# Patient Record
Sex: MALE | HISPANIC OR LATINO
[De-identification: names, ages, dates, MRNs, and addresses within clinical notes are randomized per-mention and may not be internally consistent; named-entity substitution may affect disease eponyms.]

---

## 2023-01-24 PROBLEM — Z00.00 ENCOUNTER FOR PREVENTIVE HEALTH EXAMINATION: Status: ACTIVE | Noted: 2023-01-24

## 2023-01-26 ENCOUNTER — APPOINTMENT (OUTPATIENT)
Dept: OTOLARYNGOLOGY | Facility: CLINIC | Age: 38
End: 2023-01-26
Payer: COMMERCIAL

## 2023-01-26 VITALS
WEIGHT: 211 LBS | TEMPERATURE: 98.1 F | HEIGHT: 68 IN | HEART RATE: 83 BPM | BODY MASS INDEX: 31.98 KG/M2 | SYSTOLIC BLOOD PRESSURE: 125 MMHG | DIASTOLIC BLOOD PRESSURE: 81 MMHG

## 2023-01-26 DIAGNOSIS — Z87.891 PERSONAL HISTORY OF NICOTINE DEPENDENCE: ICD-10-CM

## 2023-01-26 DIAGNOSIS — J32.9 CHRONIC SINUSITIS, UNSPECIFIED: ICD-10-CM

## 2023-01-26 DIAGNOSIS — J30.2 OTHER SEASONAL ALLERGIC RHINITIS: ICD-10-CM

## 2023-01-26 DIAGNOSIS — Z78.9 OTHER SPECIFIED HEALTH STATUS: ICD-10-CM

## 2023-01-26 DIAGNOSIS — Z80.6 FAMILY HISTORY OF LEUKEMIA: ICD-10-CM

## 2023-01-26 DIAGNOSIS — Z82.49 FAMILY HISTORY OF ISCHEMIC HEART DISEASE AND OTHER DISEASES OF THE CIRCULATORY SYSTEM: ICD-10-CM

## 2023-01-26 DIAGNOSIS — Z82.61 FAMILY HISTORY OF ARTHRITIS: ICD-10-CM

## 2023-01-26 DIAGNOSIS — Q10.0 CONGENITAL PTOSIS: ICD-10-CM

## 2023-01-26 PROCEDURE — 31575 DIAGNOSTIC LARYNGOSCOPY: CPT

## 2023-01-26 PROCEDURE — 99204 OFFICE O/P NEW MOD 45 MIN: CPT | Mod: 25

## 2023-01-26 RX ORDER — CETIRIZINE HCL 10 MG
10 TABLET ORAL
Refills: 0 | Status: ACTIVE | COMMUNITY

## 2023-01-26 RX ORDER — CALCIUM CARBONATE 500 MG/1
500 TABLET, CHEWABLE ORAL
Refills: 0 | Status: ACTIVE | COMMUNITY

## 2023-01-26 NOTE — HISTORY OF PRESENT ILLNESS
[de-identified] : 39 y/o M presents for evaluation of acid reflux. Pt says he has heartburn x 8 years. Pt has modified diet to alleviate pain, including limiting spicy foods. Pt notes reflux is worse at night, and prevents him from falling asleep. Uses over the counter Pepcid and Tums to alleviate pain. Has not seen a gastroenterologist regarding these symptoms.Patient denies dysphagia, odynophagia, dyspnea, dysphonia or otalgia. \par \par Pt also reports intermittent epistaxis.  He also has been having green D/C from the nose.\par

## 2023-01-26 NOTE — PHYSICAL EXAM
[Midline] : trachea located in midline position [Laryngoscopy Performed] : laryngoscopy was performed, see procedure section for findings [Normal] : no rashes [de-identified] : + edema

## 2023-01-26 NOTE — CONSULT LETTER
[Dear  ___] : Dear  [unfilled], [Please see my note below.] : Please see my note below. [Sincerely,] : Sincerely, [Consult Letter:] : I had the pleasure of evaluating your patient, [unfilled]. [FreeTextEntry2] : Greta Rebollar, DO [FreeTextEntry3] : Johnathan Parr MD, FACS\par Chief of Otolaryngology and Head & Neck Surgery\par [facility]\par  - Dept. of Otolaryngology\par [facility] [employer]\par \par

## 2023-01-26 NOTE — PROCEDURE
[Unable to Cooperate with Mirror] : patient unable to cooperate with mirror [Globus] : globus [Flexible Endoscope] : examined with the flexible endoscope [Serial Number: ___] : Serial Number: [unfilled] [Normal] : the false vocal folds were pink and regular, the ventricular sulcus was open, the true vocal folds were glistening white, tense and of equal length, mobility, and height [True Vocal Cords Paralysis] : no true vocal cord paralysis [True Vocal Cords Erythematous] : no true vocal cord edema [True Vocal Cords Pimentel's Nodules] : no true vocal cord nodules [Glottis Arytenoid Cartilages] : no arytenoid granulomas [Glottis Arytenoid Cartilages Erythema] : no arytenoid erythema [Interarytenoid Edema] : interarytenoid area edematous [de-identified] : reflux symptoms [de-identified] : Surgilube

## 2023-01-26 NOTE — ASSESSMENT
[FreeTextEntry1] : Pt's findings are c/w LPR, but he also has evidence of R posterior sinusitis.\par \par Pt to start Augmentin and will f/u in 2 weeks.\par \par Reflux precautions and behavioral modifications were discussed with patient including weight loss, the avoidance of caffeine, alcohol and tobacco ingestion, avoidance of eating within 3 hours of retiring for sleep, and elevation of the head of the bed.\par

## 2023-02-09 ENCOUNTER — APPOINTMENT (OUTPATIENT)
Dept: OTOLARYNGOLOGY | Facility: CLINIC | Age: 38
End: 2023-02-09
Payer: COMMERCIAL

## 2023-02-09 VITALS
HEIGHT: 68 IN | BODY MASS INDEX: 31.98 KG/M2 | DIASTOLIC BLOOD PRESSURE: 71 MMHG | TEMPERATURE: 97.5 F | WEIGHT: 211 LBS | HEART RATE: 71 BPM | SYSTOLIC BLOOD PRESSURE: 114 MMHG

## 2023-02-09 DIAGNOSIS — K21.9 GASTRO-ESOPHAGEAL REFLUX DISEASE W/OUT ESOPHAGITIS: ICD-10-CM

## 2023-02-09 PROCEDURE — 31575 DIAGNOSTIC LARYNGOSCOPY: CPT

## 2023-02-09 PROCEDURE — 99213 OFFICE O/P EST LOW 20 MIN: CPT | Mod: 25

## 2023-02-09 RX ORDER — FAMOTIDINE 40 MG/1
TABLET, FILM COATED ORAL
Refills: 0 | Status: COMPLETED | COMMUNITY
End: 2023-02-09

## 2023-02-09 RX ORDER — OMEPRAZOLE 20 MG/1
20 CAPSULE, DELAYED RELEASE ORAL DAILY
Qty: 30 | Refills: 5 | Status: ACTIVE | COMMUNITY
Start: 2023-02-09 | End: 1900-01-01

## 2023-02-09 RX ORDER — AMOXICILLIN AND CLAVULANATE POTASSIUM 875; 125 MG/1; MG/1
875-125 TABLET, COATED ORAL TWICE DAILY
Qty: 20 | Refills: 0 | Status: COMPLETED | COMMUNITY
Start: 2023-01-26 | End: 2023-02-09

## 2023-02-09 NOTE — ASSESSMENT
[FreeTextEntry1] : Pt's sinus infection appears to have resolved with the Abx.  However, his laryngeal findings remain unchanged.  This is suggestive of underlying GERD/LPR. \par \par Reflux precautions and behavioral modifications were discussed with patient including weight loss, the avoidance of caffeine, alcohol and tobacco ingestion, avoidance of eating within 3 hours of retiring for sleep, and elevation of the head of the bed.\par \par Pt also to start Omeprazole.  \par \par If he is not getting better within a few weeks we will need to consider a GI evaluation.

## 2023-02-09 NOTE — REVIEW OF SYSTEMS
[Sneezing] : sneezing [Ear Itch] : ear itch [Nasal Congestion] : nasal congestion [Throat Clearing] : throat clearing [Throat Dryness] : throat dryness [Negative] : Heme/Lymph [de-identified] : more in the AM

## 2023-02-09 NOTE — REASON FOR VISIT
[Subsequent Evaluation] : a subsequent evaluation for [FreeTextEntry2] : following up after last visit, still feeling the heartburn and continuing of using over the counter medication tums and pepcid

## 2023-02-09 NOTE — PHYSICAL EXAM
[Midline] : trachea located in midline position [Laryngoscopy Performed] : laryngoscopy was performed, see procedure section for findings [Normal] : no rashes [de-identified] : + edema

## 2023-02-09 NOTE — HISTORY OF PRESENT ILLNESS
[de-identified] : 37 y/o M presents for evaluation of acid reflux. Pt says he has heartburn x 8 years. Pt has modified diet to alleviate pain, including limiting spicy foods. Pt notes reflux is worse at night, and prevents him from falling asleep. Uses over the counter Pepcid and Tums to alleviate pain. Has not seen a gastroenterologist regarding these symptoms.Patient denies dysphagia, odynophagia, dyspnea, dysphonia or otalgia.  [FreeTextEntry1] : Pt was found to have pus draining from his R posterior ethmoid region on his last visit

## 2023-02-09 NOTE — PROCEDURE
[Unable to Cooperate with Mirror] : patient unable to cooperate with mirror [Globus] : globus [None] : none [Flexible Endoscope] : examined with the flexible endoscope [Serial Number: ___] : Serial Number: [unfilled] [Normal] : the false vocal folds were pink and regular, the ventricular sulcus was open, the true vocal folds were glistening white, tense and of equal length, mobility, and height [True Vocal Cords Paralysis] : no true vocal cord paralysis [True Vocal Cords Erythematous] : no true vocal cord edema [True Vocal Cords Pimentel's Nodules] : no true vocal cord nodules [Glottis Arytenoid Cartilages] : no arytenoid granulomas [Glottis Arytenoid Cartilages Erythema] : no arytenoid erythema [Arytenoid Erythema ___ /4] : arytenoid erythema [unfilled]U/4 [Interarytenoid Edema] : interarytenoid area edematous [de-identified] : Surgilube

## 2023-02-09 NOTE — CONSULT LETTER
[Dear  ___] : Dear  [unfilled], [Courtesy Letter:] : I had the pleasure of seeing your patient, [unfilled], in my office today. [Please see my note below.] : Please see my note below. [Consult Closing:] : Thank you very much for allowing me to participate in the care of this patient.  If you have any questions, please do not hesitate to contact me. [Sincerely,] : Sincerely, [FreeTextEntry2] : Greta Rebollar, DO  [FreeTextEntry3] : Johnathan Parr MD, FACS\par Chief of Otolaryngology and Head & Neck Surgery\par [facility]\par  - Dept. of Otolaryngology\par [facility] [employer]\par \par

## 2024-06-21 ENCOUNTER — NON-APPOINTMENT (OUTPATIENT)
Age: 39
End: 2024-06-21

## 2025-07-07 ENCOUNTER — NON-APPOINTMENT (OUTPATIENT)
Age: 40
End: 2025-07-07

## 2025-07-07 ENCOUNTER — APPOINTMENT (OUTPATIENT)
Dept: OPHTHALMOLOGY | Facility: CLINIC | Age: 40
End: 2025-07-07
Payer: COMMERCIAL

## 2025-07-07 PROCEDURE — 92004 COMPRE OPH EXAM NEW PT 1/>: CPT

## 2025-08-04 ENCOUNTER — APPOINTMENT (OUTPATIENT)
Dept: OPHTHALMOLOGY | Facility: CLINIC | Age: 40
End: 2025-08-04
Payer: COMMERCIAL

## 2025-08-04 ENCOUNTER — NON-APPOINTMENT (OUTPATIENT)
Age: 40
End: 2025-08-04

## 2025-08-04 PROCEDURE — 92285 EXTERNAL OCULAR PHOTOGRAPHY: CPT

## 2025-08-04 PROCEDURE — 92083 EXTENDED VISUAL FIELD XM: CPT

## 2025-08-04 PROCEDURE — 99205 OFFICE O/P NEW HI 60 MIN: CPT
